# Patient Record
Sex: FEMALE | Race: WHITE | Employment: FULL TIME | URBAN - METROPOLITAN AREA
[De-identification: names, ages, dates, MRNs, and addresses within clinical notes are randomized per-mention and may not be internally consistent; named-entity substitution may affect disease eponyms.]

---

## 2017-07-06 ENCOUNTER — OFFICE VISIT (OUTPATIENT)
Dept: SURGERY | Age: 37
End: 2017-07-06

## 2017-07-06 VITALS
RESPIRATION RATE: 20 BRPM | BODY MASS INDEX: 42.81 KG/M2 | DIASTOLIC BLOOD PRESSURE: 100 MMHG | WEIGHT: 282.5 LBS | HEART RATE: 80 BPM | HEIGHT: 68 IN | TEMPERATURE: 99.3 F | SYSTOLIC BLOOD PRESSURE: 140 MMHG | OXYGEN SATURATION: 99 %

## 2017-07-06 DIAGNOSIS — Z46.51 ADMISSION FOR ADJUSTMENT OF GASTRIC LAP BAND: Primary | ICD-10-CM

## 2017-07-06 DIAGNOSIS — R63.5 WEIGHT GAIN: ICD-10-CM

## 2017-07-06 DIAGNOSIS — K21.9 GASTROESOPHAGEAL REFLUX DISEASE, ESOPHAGITIS PRESENCE NOT SPECIFIED: ICD-10-CM

## 2017-07-06 DIAGNOSIS — R13.19 INTERMITTENT DYSPHAGIA: ICD-10-CM

## 2017-07-06 DIAGNOSIS — E66.01 MORBID OBESITY WITH BMI OF 40.0-44.9, ADULT (HCC): ICD-10-CM

## 2017-07-06 RX ORDER — VENLAFAXINE HYDROCHLORIDE 150 MG/1
150 TABLET, EXTENDED RELEASE ORAL
COMMUNITY

## 2017-07-06 NOTE — PROGRESS NOTES
1. Have you been to the ER, urgent care clinic since your last visit? Hospitalized since your last visit? no     2. Have you seen or consulted any other health care providers outside of the [address] [place] [address] since your last visit? Include any pap smears or colon screening.  no

## 2017-07-06 NOTE — Clinical Note
Self pay band and she paid $100 today for deflation  She would like info on self pay price for band removal - thanks

## 2017-07-06 NOTE — PROGRESS NOTES
Subjective:   Oseas Aggarwal is a 40 y.o. female with previous lap band surgery, who is here today needing lap band adjustment because of weight gain, productive burping, reflux and difficulty swallowing. Dietary compliance is fair. She has had trouble with the band and eating for \"years\", but it has worsened over the past 8-12 months. She is experiencing increased reflux and regurgitation. She takes \"forever to eat\" because it \"sits and sometimes I throw up\". No port pain, no night or post prandial cough, no hoarseness. She has trouble eating in a social situation and with other people. She would like her band removed. She has not been seen in follow up for > 5 years and has no lap band coverage. Suspect she has a 10 cm band based on timing of her procedure. Surgeon was Diya Ng MD.      Objective:     Visit Vitals    BP (!) 140/100    Pulse 80    Temp 99.3 °F (37.4 °C) (Oral)    Resp 20    Ht 5' 7.5\" (1.715 m)    Wt 282 lb 8 oz (128.1 kg)    SpO2 99%    BMI 43.59 kg/m2      Estimated body mass index is 43.59 kg/(m^2) as calculated from the following:    Height as of this encounter: 5' 7.5\" (1.715 m). Weight as of this encounter: 282 lb 8 oz (128.1 kg). Wt Readings from Last 3 Encounters:   07/06/17 282 lb 8 oz (128.1 kg)   04/30/11 250 lb (113.4 kg)     Her change in weight since last visit: gained, 32 lbs. A + O x 3, here with mother   Chest  CTA  COR  RRR  ABD Soft, obese, port faintly palpable and non tender, ND, +BS, no hernias. EXT No edema; ambulating independently        Assessment/Plan: Morbid Obesity, status post lap-band surgery, needing un- fill adjustment  Lap Band FiIl Procedure    Verbal consent was obtained. The patient was placed in the supine position per her request as she fainted the last adjustment. The port area was prepped using sterile technique and a Dale needle was inserted. The port was accessed with ease.   Previous Fill Volume:  Unknown cc.   Added:  3 cc clear saline appearing fluid   Tolerated cup of water without nausea, vomiting, pain or regurgitation   Will proceed with UGI and then surgeon evaluation for removal of adjustable gastric band system   Patient tolerated the procedure well. UGI and surgeon visit next week  If develops abdominal pain and or intolerance to liquids she is to be seen asap  Briefly discussed options of revision to a gastric bypass, but she has no Bariatric coverage and just \"wants the band out\"  Emily Griffiths verbalized understanding and questions were answered to the best of my knowledge and ability. Red Zone symptoms educational materials were provided.                   \

## 2017-07-06 NOTE — MR AVS SNAPSHOT
Visit Information Date & Time Provider Department Dept. Phone Encounter #  
 7/6/2017 11:40 AM Joel Sanchez NP [facility] GENERAL SURGERY [address] [phone] [id number] Upcoming Health Maintenance Date Due DTaP/Tdap/Td series (1 - Tdap) 4/9/2001 PAP AKA CERVICAL CYTOLOGY 4/9/2001 INFLUENZA AGE 9 TO ADULT 8/1/2017 Allergies as of 7/6/2017  Review Complete On: 7/6/2017 By: Usha Carreon LPN Severity Noted Reaction Type Reactions Ceclor [Cefaclor]  04/30/2011   Side Effect Itching Sulfa (Sulfonamide Antibiotics)  04/30/2011   Side Effect Itching Current Immunizations  Never Reviewed No immunizations on file. Not reviewed this visit You Were Diagnosed With   
  
 Codes Comments Regurgitation    -  Primary ICD-10-CM: R11.10 ICD-9-CM: 787.03 Gastroesophageal reflux disease, esophagitis presence not specified     ICD-10-CM: K21.9 ICD-9-CM: 530.81 Intermittent dysphagia     ICD-10-CM: R13.19 ICD-9-CM: 787.29 Vitals BP Pulse Temp Resp Height(growth percentile) Weight(growth percentile) (!) 140/100 80 99.3 °F (37.4 °C) (Oral) 20 5' 7.5\" (1.715 m) 282 lb 8 oz (128.1 kg) SpO2 BMI OB Status Smoking Status 99% 43.59 kg/m2 Having regular periods Never Smoker Vitals History BMI and BSA Data Body Mass Index Body Surface Area  
 43.59 kg/m 2 2.47 m 2 Your Updated Medication List  
  
   
This list is accurate as of: 7/6/17 12:34 PM.  Always use your most recent med list.  
  
  
  
  
 SYNTHROID 150 mcg tablet Generic drug:  levothyroxine Take 150 mcg by mouth daily (before breakfast). Venlafaxine 150 mg Tr24 Take  by mouth. To-Do List   
 07/13/2017 Imaging:  XR UPPER GI W KUB/ BA SWALLOW Patient Instructions If you develop abdominal pain and or cannot tolerate fluids, you need to be seen asap, so please call the office [phone] or come to [facility] ER if after hours Wilda Vasques will call you to arrange date/time for the upper GI and plan on seeing surgeon the same day Upper GI Series: About This Test 
What is it? An upper gastrointestinal (GI) series looks at the upper and middle sections of the gastrointestinal tract. The test uses barium contrast material, fluoroscopy, and X-ray. Fluoroscopy is a kind of X-ray. Why is this test done? An upper GI series is done to: · Find the cause of gastrointestinal symptoms, such as vomiting, burping up food, trouble swallowing, or belly pain. · Find inflamed areas of the intestine. · Find narrow spots (strictures) in the upper intestinal tract or find ulcers, tumors, polyps, or pyloric stenosis. · Find swallowed objects. How can you prepare for the test? 
Tell your doctor if: 
· You are taking any medicine. · You are allergic to any medicines, barium, or any other X-ray contrast material. 
· You are or might be pregnant. This test is not done during pregnancy because of the risk of radiation to the baby (fetus). Your doctor may ask you to do one or all of the following: 
· Eat a low-fiber diet for a few days before the test. 
· Stop eating for 12 hours before the test. 
· Take a laxative to help clean out your intestines the evening before the test. 
· Stop taking certain medicines. What happens before the test? 
The test is usually done in a clinic or the X-ray department of a hospital. 
· You will need to take off your clothes and put on a hospital gown. · Take out any dentures, and take off any jewelry. What happens during the test? 
· You will lie on your back on an X-ray table. · You will have an X-ray taken before you drink the barium mix. Then you'll take small swallows repeatedly during the series of X-rays that follow. · The doctor watches the barium pass through your GI tract using fluoroscopy and X-ray pictures.  The table is tilted at different positions, and you may change positions to help spread the barium. What else should you know about the test? 
· You may be given a laxative or enema to flush the barium out of your intestines after the test. This prevents constipation. · It's a good idea to drink a lot of fluids for a few days to flush out the barium. · For 1 to 3 days after the test, your stool will look white from the barium. How long does the test take? · The test will take about 30 to 40 minutes. If you are also having a small bowel study, the test will take 2 to 6 hours. In some cases, you may be asked to come back after 24 hours to have more X-rays taken. What happens after the test? 
· You will probably be able to go home right away. Results of the test are usually ready in 1 to 3 days. · You can go back to your usual activities right away. You may eat and drink whatever you like, unless your doctor tells you not to. When should you call for help? Watch closely for changes in your health, and be sure to contact your doctor if: 
· You aren't able to have a bowel movement in 2 to 3 days after the test. 
Follow-up care is a key part of your treatment and safety. Be sure to make and go to all appointments, and call your doctor if you are having problems. It's also a good idea to keep a list of the medicines you take. Ask your doctor when you can expect to have your test results. Where can you learn more? Go to http://sonia-roxann.info/. Enter J416 in the search box to learn more about \"Upper GI Series: About This Test.\" Current as of: October 14, 2016 Content Version: 11.3 © 5892-8603 [employer]. Care instructions adapted under license by [employer] (which disclaims liability or warranty for this information).  If you have questions about a medical condition or this instruction, always ask your healthcare professional. [employer] disclaims any warranty or liability for your use of this information. Introducing [facility]! Maria Antonia Banda introduces [facility] patient portal. Now you can access parts of your medical record, email your doctor's office, and request medication refills online. 1. In your internet browser, go to [email or web address] 2. Click on the First Time User? Click Here link in the Sign In box. You will see the New Member Sign Up page. 3. Enter your [facility] Access Code exactly as it appears below. You will not need to use this code after youve completed the sign-up process. If you do not sign up before the expiration date, you must request a new code. · [facility] Access Code: [email or web address] Expires: 10/4/2017 12:07 PM 
 
4. Enter the last four digits of your Social Security Number (xxxx) and Date of Birth (mm/dd/yyyy) as indicated and click Submit. You will be taken to the next sign-up page. 5. Create a [facility] ID. This will be your [facility] login ID and cannot be changed, so think of one that is secure and easy to remember. 6. Create a [facility] password. You can change your password at any time. 7. Enter your Password Reset Question and Answer. This can be used at a later time if you forget your password. 8. Enter your e-mail address. You will receive e-mail notification when new information is available in [address] 9. Click Sign Up. You can now view and download portions of your medical record. 10. Click the Download Summary menu link to download a portable copy of your medical information. If you have questions, please visit the Frequently Asked Questions section of the [facility] website. Remember, [facility] is NOT to be used for urgent needs. For medical emergencies, dial 911. Now available from your iPhone and Android! Please provide this summary of care documentation to your next provider. Your primary care clinician is listed as Charmayne China. If you have any questions after today's visit, please call [phone].

## 2017-07-06 NOTE — PATIENT INSTRUCTIONS
If you develop abdominal pain and or cannot tolerate fluids, you need to be seen asap, so please call the office [phone] or come to [facility] ER if after hours     Harry Friend will call you to arrange date/time for the upper GI and plan on seeing surgeon the same day          Upper GI Series: About This Test  What is it? An upper gastrointestinal (GI) series looks at the upper and middle sections of the gastrointestinal tract. The test uses barium contrast material, fluoroscopy, and X-ray. Fluoroscopy is a kind of X-ray. Why is this test done? An upper GI series is done to:  · Find the cause of gastrointestinal symptoms, such as vomiting, burping up food, trouble swallowing, or belly pain. · Find inflamed areas of the intestine. · Find narrow spots (strictures) in the upper intestinal tract or find ulcers, tumors, polyps, or pyloric stenosis. · Find swallowed objects. How can you prepare for the test?  Tell your doctor if:  · You are taking any medicine. · You are allergic to any medicines, barium, or any other X-ray contrast material.  · You are or might be pregnant. This test is not done during pregnancy because of the risk of radiation to the baby (fetus). Your doctor may ask you to do one or all of the following:  · Eat a low-fiber diet for a few days before the test.  · Stop eating for 12 hours before the test.  · Take a laxative to help clean out your intestines the evening before the test.  · Stop taking certain medicines. What happens before the test?  The test is usually done in a clinic or the X-ray department of a hospital.  · You will need to take off your clothes and put on a hospital gown. · Take out any dentures, and take off any jewelry. What happens during the test?  · You will lie on your back on an X-ray table. · You will have an X-ray taken before you drink the barium mix. Then you'll take small swallows repeatedly during the series of X-rays that follow.   · The doctor watches the barium pass through your GI tract using fluoroscopy and X-ray pictures. The table is tilted at different positions, and you may change positions to help spread the barium. What else should you know about the test?  · You may be given a laxative or enema to flush the barium out of your intestines after the test. This prevents constipation. · It's a good idea to drink a lot of fluids for a few days to flush out the barium. · For 1 to 3 days after the test, your stool will look white from the barium. How long does the test take? · The test will take about 30 to 40 minutes. If you are also having a small bowel study, the test will take 2 to 6 hours. In some cases, you may be asked to come back after 24 hours to have more X-rays taken. What happens after the test?  · You will probably be able to go home right away. Results of the test are usually ready in 1 to 3 days. · You can go back to your usual activities right away. You may eat and drink whatever you like, unless your doctor tells you not to. When should you call for help? Watch closely for changes in your health, and be sure to contact your doctor if:  · You aren't able to have a bowel movement in 2 to 3 days after the test.  Follow-up care is a key part of your treatment and safety. Be sure to make and go to all appointments, and call your doctor if you are having problems. It's also a good idea to keep a list of the medicines you take. Ask your doctor when you can expect to have your test results. Where can you learn more? Go to http://sonia-roxann.info/. Enter T228 in the search box to learn more about \"Upper GI Series: About This Test.\"  Current as of: October 14, 2016  Content Version: 11.3  © 0464-4654 [employer]. Care instructions adapted under license by [employer] (which disclaims liability or warranty for this information).  If you have questions about a medical condition or this instruction, always ask your healthcare professional. [place] [address] any warranty or liability for your use of this information.

## 2017-07-13 ENCOUNTER — OFFICE VISIT (OUTPATIENT)
Dept: SURGERY | Age: 37
End: 2017-07-13

## 2017-07-13 ENCOUNTER — HOSPITAL ENCOUNTER (OUTPATIENT)
Dept: GENERAL RADIOLOGY | Age: 37
Discharge: HOME | End: 2017-07-13
Attending: NURSE PRACTITIONER
Payer: COMMERCIAL

## 2017-07-13 VITALS
BODY MASS INDEX: 43.19 KG/M2 | WEIGHT: 285 LBS | OXYGEN SATURATION: 98 % | SYSTOLIC BLOOD PRESSURE: 140 MMHG | HEIGHT: 68 IN | RESPIRATION RATE: 18 BRPM | TEMPERATURE: 98.6 F | HEART RATE: 80 BPM | DIASTOLIC BLOOD PRESSURE: 86 MMHG

## 2017-07-13 DIAGNOSIS — K21.9 GASTROESOPHAGEAL REFLUX DISEASE, ESOPHAGITIS PRESENCE NOT SPECIFIED: ICD-10-CM

## 2017-07-13 DIAGNOSIS — R13.19 INTERMITTENT DYSPHAGIA: ICD-10-CM

## 2017-07-13 DIAGNOSIS — R13.19 ESOPHAGEAL DYSPHAGIA: Primary | ICD-10-CM

## 2017-07-13 DIAGNOSIS — K21.9 GASTROESOPHAGEAL REFLUX DISEASE WITHOUT ESOPHAGITIS: ICD-10-CM

## 2017-07-13 DIAGNOSIS — R11.2 NON-INTRACTABLE VOMITING WITH NAUSEA, UNSPECIFIED VOMITING TYPE: ICD-10-CM

## 2017-07-13 PROCEDURE — 74241 XR UPPER GI SERIES W KUB: CPT

## 2017-07-13 NOTE — PROGRESS NOTES
1. Have you been to the ER, urgent care clinic since your last visit? Hospitalized since your last visit? No    2. Have you seen or consulted any other health care providers outside of the [address] [place] [address] since your last visit? Include any pap smears or colon screening. No     Rosa Levy  Body composition    female  40 y.o. Vitals:    07/13/17 1439   BP: 140/86   Pulse: 80   Resp: 18   Temp: 98.6 °F (37 °C)   TempSrc: Oral   SpO2: 98%   Weight: 285 lb (129.3 kg)   Height: 5' 7.5\" (1.715 m)     Body mass index is 43.98 kg/(m^2).

## 2017-07-17 PROBLEM — K21.9 GASTROESOPHAGEAL REFLUX DISEASE WITHOUT ESOPHAGITIS: Status: ACTIVE | Noted: 2017-07-17

## 2017-07-17 PROBLEM — R13.19 ESOPHAGEAL DYSPHAGIA: Status: ACTIVE | Noted: 2017-07-17

## 2017-07-17 PROBLEM — R11.2 NON-INTRACTABLE VOMITING WITH NAUSEA: Status: ACTIVE | Noted: 2017-07-17

## 2017-07-17 NOTE — PROGRESS NOTES
Subjective:      Olesya Johns is a 40 y.o. female presents for postop care 12 years following LAGB with Dr. Kike Lamb. Appetite is fair. Eating a regular diet with persistent dysphagia, reflux, and regurgitation despite band decompression. Bowel movements are regular. The patient is voiding without difficulty. She denies hematemesis or port-site pain. Objective:     Visit Vitals    /86 (BP 1 Location: Right arm, BP Patient Position: Sitting)    Pulse 80    Temp 98.6 °F (37 °C) (Oral)    Resp 18    Ht 5' 7.5\" (1.715 m)    Wt 285 lb (129.3 kg)    SpO2 98%    BMI 43.98 kg/m2       General:  alert, cooperative, no distress, appears stated age, morbidly obese   Abdomen: deferred   Incision:   deferred     Assessment:     Dysphagia, regurgitation, GERD following laparoscopic adjustable gastric banding. No improvement following band decompression. She desires band/port removal. I consider this a reasonable option given her symptoms. Plan:     1. Continue current medications. 2. Diet as tolerated. 3. Will arrange price analysis for self-pay laparoscopic removal of adjustable gastric band/port components as outpatient procedure.

## 2017-08-01 ENCOUNTER — HOSPITAL ENCOUNTER (OUTPATIENT)
Dept: PREADMISSION TESTING | Age: 37
Discharge: HOME | End: 2017-08-01
Payer: SELF-PAY

## 2017-08-01 ENCOUNTER — OFFICE VISIT (OUTPATIENT)
Dept: SURGERY | Age: 37
End: 2017-08-01

## 2017-08-01 VITALS
TEMPERATURE: 80 F | HEART RATE: 98 BPM | SYSTOLIC BLOOD PRESSURE: 119 MMHG | DIASTOLIC BLOOD PRESSURE: 80 MMHG | HEIGHT: 68 IN | BODY MASS INDEX: 42.36 KG/M2 | OXYGEN SATURATION: 99 % | RESPIRATION RATE: 18 BRPM | WEIGHT: 279.5 LBS

## 2017-08-01 VITALS
WEIGHT: 279.5 LBS | SYSTOLIC BLOOD PRESSURE: 119 MMHG | HEIGHT: 68 IN | BODY MASS INDEX: 42.36 KG/M2 | OXYGEN SATURATION: 99 % | TEMPERATURE: 98.3 F | RESPIRATION RATE: 18 BRPM | DIASTOLIC BLOOD PRESSURE: 80 MMHG

## 2017-08-01 DIAGNOSIS — K21.9 GASTROESOPHAGEAL REFLUX DISEASE WITHOUT ESOPHAGITIS: ICD-10-CM

## 2017-08-01 DIAGNOSIS — R13.19 ESOPHAGEAL DYSPHAGIA: Primary | ICD-10-CM

## 2017-08-01 DIAGNOSIS — R11.2 NON-INTRACTABLE VOMITING WITH NAUSEA, UNSPECIFIED VOMITING TYPE: ICD-10-CM

## 2017-08-01 LAB
BASOPHILS # BLD AUTO: 0 K/UL (ref 0–0.1)
BASOPHILS # BLD: 0 % (ref 0–1)
EOSINOPHIL # BLD: 0.1 K/UL (ref 0–0.4)
EOSINOPHIL NFR BLD: 1 % (ref 0–7)
ERYTHROCYTE [DISTWIDTH] IN BLOOD BY AUTOMATED COUNT: 13.3 % (ref 11.5–14.5)
HCT VFR BLD AUTO: 38 % (ref 35–47)
HGB BLD-MCNC: 12.6 G/DL (ref 11.5–16)
LYMPHOCYTES # BLD AUTO: 36 % (ref 12–49)
LYMPHOCYTES # BLD: 1.6 K/UL (ref 0.8–3.5)
MCH RBC QN AUTO: 29.3 PG (ref 26–34)
MCHC RBC AUTO-ENTMCNC: 33.2 G/DL (ref 30–36.5)
MCV RBC AUTO: 88.4 FL (ref 80–99)
MONOCYTES # BLD: 0.5 K/UL (ref 0–1)
MONOCYTES NFR BLD AUTO: 10 % (ref 5–13)
NEUTS SEG # BLD: 2.3 K/UL (ref 1.8–8)
NEUTS SEG NFR BLD AUTO: 53 % (ref 32–75)
PLATELET # BLD AUTO: 211 K/UL (ref 150–400)
RBC # BLD AUTO: 4.3 M/UL (ref 3.8–5.2)
WBC # BLD AUTO: 4.5 K/UL (ref 3.6–11)

## 2017-08-01 PROCEDURE — 85025 COMPLETE CBC W/AUTO DIFF WBC: CPT | Performed by: SURGERY

## 2017-08-01 PROCEDURE — 36415 COLL VENOUS BLD VENIPUNCTURE: CPT | Performed by: SURGERY

## 2017-08-01 RX ORDER — BISMUTH SUBSALICYLATE 262 MG
1 TABLET,CHEWABLE ORAL DAILY
COMMUNITY

## 2017-08-01 RX ORDER — LANOLIN ALCOHOL/MO/W.PET/CERES
1 CREAM (GRAM) TOPICAL DAILY
COMMUNITY

## 2017-08-01 NOTE — PROGRESS NOTES
1. Have you been to the ER, urgent care clinic since your last visit? Hospitalized since your last visit? no    2. Have you seen or consulted any other health care providers outside of the [address] [place] [address] since your last visit? Include any pap smears or colon screening.    no

## 2017-08-02 NOTE — PATIENT INSTRUCTIONS
Learning About Swallowing Problems  What are swallowing problems? Certain health problems that affect the nervous system can cause trouble swallowing. These conditions include stroke, ALS (also known as Shannan Gehrig's disease), Parkinson's disease, and multiple sclerosis. The muscles and nerves that help move food through the throat and esophagus may not work right. Growths, such as cancer, and other problems with your esophagus can also make it hard to swallow. The esophagus is the tube that leads from your throat to your stomach. How are swallowing problems diagnosed? A doctor or speech therapist will examine you to check for swallowing problems. You may get swallowing tests to check how well your throat muscles work. For these tests, you swallow a special liquid that helps the doctor see your throat and esophagus on an X-ray or video screen. Other tests use a thin, flexible tube called a scope to check for problems with your esophagus. The doctor puts the scope in your mouth and down your throat to look at your esophagus. What are the symptoms? Symptoms of swallowing problems may include:  · Trouble getting food or liquids to go down on the first try. · Gagging, choking, or coughing when you swallow. · Having food or liquids come back up through your throat, mouth, or nose after you swallow. · Feeling like foods or liquids are stuck in some part of your throat or chest.  · Pain when you swallow. How are swallowing problems treated? How swallowing problems are treated depends on the cause. The main goals of treatment will be to help you eat and swallow safely and get good nutrition. This is important for your health and quality of life. You may learn exercises to train your throat muscles to work together so you're able to swallow better. Learning certain ways to put food in your mouth or to position your head while eating may also help.   Your doctor or a speech therapist may recommend changes to your diet to help make it easier to swallow. You may need to avoid certain foods or liquids. You also may need to change the thickness of foods or liquids in your diet. To eat and swallow safely, follow any instructions you get from your doctor or therapist. These ideas may help:  · Sit upright when eating, drinking, and taking pills. · Take small bites of food. Chew completely and swallow before taking another bite. · Take small sips of liquids. Hold the liquid in your mouth as you prepare to swallow. · If eating makes you tired, eat smaller but more frequent meals. · If you cough or choke, lean forward and keep your chin tipped downward while you cough. Where can you learn more? Go to http://sonia-roxann.info/. Enter 151 [id number] [id number] in the search box to learn more about \"Learning About Swallowing Problems. \"  Current as of: March 20, 2017  Content Version: 11.3  © 8039-7202 [employer]. Care instructions adapted under license by [employer] (which disclaims liability or warranty for this information). If you have questions about a medical condition or this instruction, always ask your healthcare professional. [place] [address] any warranty or liability for your use of this information.

## 2017-08-02 NOTE — PROGRESS NOTES
Subjective:      Carroll Schreiber is a 40 y.o. female with a history of morbid obesity managed through laparoscopic adjustable gastric banding in 2005. She has developed dysphagia, regurgitation, and GERD symptoms. These symptoms have improved somewhat following band decompression but her ability to eat normally is still limited. She denies fever, chills, hematemesis, aspiration, wheezing, or chest pain. Patient Active Problem List    Diagnosis Date Noted    Esophageal dysphagia 07/17/2017    Regurgitation 07/17/2017    Non-intractable vomiting with nausea 07/17/2017    Gastroesophageal reflux disease without esophagitis 07/17/2017     Past Medical History:   Diagnosis Date    Headache     Hypothyroid     Psychiatric disorder     DEPRESSION    Sleep apnea     USES A MOUTH GUARD      Past Surgical History:   Procedure Laterality Date    ABDOMEN SURGERY PROC UNLISTED  2005    Lap band surgery by Dr. Juan José Lopez.  HX HEENT  1997    wisdom teeth removed    SINUS SURGERY PROC UNLISTED  2013      Social History   Substance Use Topics    Smoking status: Never Smoker    Smokeless tobacco: Never Used    Alcohol use Yes      Comment: Socially      Family History   Problem Relation Age of Onset    Heart Disease Father     Hypertension Father     Anesth Problems Mother      NAUSEA, NO VOMITING    Asthma Brother     Cancer Maternal Grandmother      BREAST CA    Diabetes Neg Hx       Current Outpatient Prescriptions   Medication Sig    glucosamine-chondroitin (ARTHX) 500-400 mg cap Take 1 Cap by mouth daily.  multivitamin (MULTIPLE VITAMINS) tablet Take 1 Tab by mouth daily.  Venlafaxine 150 mg tr24 Take 150 mg by mouth nightly.  levothyroxine (SYNTHROID) 150 mcg tablet Take 150 mcg by mouth daily (before breakfast). No current facility-administered medications for this visit.        Allergies   Allergen Reactions    Ceclor [Cefaclor] Itching    Sulfa (Sulfonamide Antibiotics) Itching Review of Systems:    A complete review of systems was negative except as noted in the HPI. Objective:        Visit Vitals    /80    Pulse 98    Temp (!) 80 °F (26.7 °C)    Resp 18    Ht 5' 7.5\" (1.715 m)    Wt 279 lb 8 oz (126.8 kg)    LMP 07/04/2017    SpO2 99%    BMI 43.13 kg/m2       Physical Exam:  GENERAL: alert, cooperative, no distress, appears stated age, morbidly obese, EYE: negative, THROAT & NECK: normal, LUNG: clear to auscultation bilaterally, HEART: regular rate and rhythm, S1, S2 normal, no murmur. ABDOMEN: Obese, non-distended, soft; palpable, non-tender port. No pain with palpation or cellulitis. EXTREMITIES:  extremities normal, atraumatic, no cyanosis or edema, SKIN: Normal., NEUROLOGIC: negative      Assessment:     Dysphagia following laparoscopic adjustable gastric banding. Plan:     1. I recommend proceeding with laparoscopic removal of band/port components with upper endoscopy. 2. Discussed aspects of surgical intervention, methods, risks (including by not limited to infection, bleeding, hematoma, conversion to open procedure, recurrent/persistent symptoms, and perforation of the intestines or solid organs), and the risks of general anesthetic. The patient understands the risks; all questions were answered to the patient's satisfaction. 3. Patient does wish to proceed with surgery.       Signed By: Catie Melendez MD     August 2, 2017

## 2017-08-11 ENCOUNTER — ANESTHESIA (OUTPATIENT)
Dept: SURGERY | Age: 37
End: 2017-08-11
Payer: SELF-PAY

## 2017-08-11 ENCOUNTER — ANESTHESIA EVENT (OUTPATIENT)
Dept: SURGERY | Age: 37
End: 2017-08-11
Payer: SELF-PAY

## 2017-08-11 ENCOUNTER — HOSPITAL ENCOUNTER (OUTPATIENT)
Age: 37
Setting detail: OUTPATIENT SURGERY
Discharge: HOME | End: 2017-08-11
Attending: SURGERY | Admitting: SURGERY
Payer: SELF-PAY

## 2017-08-11 VITALS
HEART RATE: 80 BPM | OXYGEN SATURATION: 96 % | BODY MASS INDEX: 42.44 KG/M2 | TEMPERATURE: 98.4 F | HEIGHT: 68 IN | DIASTOLIC BLOOD PRESSURE: 69 MMHG | SYSTOLIC BLOOD PRESSURE: 128 MMHG | RESPIRATION RATE: 20 BRPM | WEIGHT: 280 LBS

## 2017-08-11 LAB — HCG UR QL: NEGATIVE

## 2017-08-11 PROCEDURE — 76060000033 HC ANESTHESIA 1 TO 1.5 HR: Performed by: SURGERY

## 2017-08-11 PROCEDURE — 77030027138 HC INCENT SPIROMETER -A

## 2017-08-11 PROCEDURE — 77030035045 HC TRCR ENDOSC VRSPRT BLDLSS COVD -B: Performed by: SURGERY

## 2017-08-11 PROCEDURE — 77030032435: Performed by: SURGERY

## 2017-08-11 PROCEDURE — 74011250636 HC RX REV CODE- 250/636: Performed by: ANESTHESIOLOGY

## 2017-08-11 PROCEDURE — 77030018836 HC SOL IRR NACL ICUM -A: Performed by: SURGERY

## 2017-08-11 PROCEDURE — 74011250636 HC RX REV CODE- 250/636

## 2017-08-11 PROCEDURE — 76210000021 HC REC RM PH II 0.5 TO 1 HR: Performed by: SURGERY

## 2017-08-11 PROCEDURE — 76210000016 HC OR PH I REC 1 TO 1.5 HR: Performed by: SURGERY

## 2017-08-11 PROCEDURE — 77030035048 HC TRCR ENDOSC OPTCL COVD -B: Performed by: SURGERY

## 2017-08-11 PROCEDURE — 74011250637 HC RX REV CODE- 250/637: Performed by: ANESTHESIOLOGY

## 2017-08-11 PROCEDURE — 77030034154 HC SHR COAG HARM ACE J&J -F: Performed by: SURGERY

## 2017-08-11 PROCEDURE — 77030020053 HC ELECTRD LAPSCP COVD -B: Performed by: SURGERY

## 2017-08-11 PROCEDURE — 77030002895 HC DEV VASC CLOSR COVD -B: Performed by: SURGERY

## 2017-08-11 PROCEDURE — 77030011640 HC PAD GRND REM COVD -A: Performed by: SURGERY

## 2017-08-11 PROCEDURE — 76010000149 HC OR TIME 1 TO 1.5 HR: Performed by: SURGERY

## 2017-08-11 PROCEDURE — 74011000250 HC RX REV CODE- 250

## 2017-08-11 PROCEDURE — 74011000250 HC RX REV CODE- 250: Performed by: SURGERY

## 2017-08-11 PROCEDURE — 81025 URINE PREGNANCY TEST: CPT

## 2017-08-11 PROCEDURE — 77030002933 HC SUT MCRYL J&J -A: Performed by: SURGERY

## 2017-08-11 PROCEDURE — 77030035051: Performed by: SURGERY

## 2017-08-11 PROCEDURE — 77030013079 HC BLNKT BAIR HGGR 3M -A: Performed by: ANESTHESIOLOGY

## 2017-08-11 PROCEDURE — 74011250636 HC RX REV CODE- 250/636: Performed by: SURGERY

## 2017-08-11 PROCEDURE — 77030008684 HC TU ET CUF COVD -B: Performed by: ANESTHESIOLOGY

## 2017-08-11 PROCEDURE — 77030008756 HC TU IRR SUC STRY -B: Performed by: SURGERY

## 2017-08-11 PROCEDURE — 77030010507 HC ADH SKN DERMBND J&J -B: Performed by: SURGERY

## 2017-08-11 PROCEDURE — 77030020747 HC TU INSUF ENDOSC TELE -A: Performed by: SURGERY

## 2017-08-11 PROCEDURE — 77030008771 HC TU NG SALEM SUMP -A: Performed by: ANESTHESIOLOGY

## 2017-08-11 PROCEDURE — 77030026438 HC STYL ET INTUB CARD -A: Performed by: ANESTHESIOLOGY

## 2017-08-11 PROCEDURE — 77030020782 HC GWN BAIR PAWS FLX 3M -B

## 2017-08-11 PROCEDURE — 77030031139 HC SUT VCRL2 J&J -A: Performed by: SURGERY

## 2017-08-11 PROCEDURE — 77030032490 HC SLV COMPR SCD KNE COVD -B: Performed by: SURGERY

## 2017-08-11 PROCEDURE — 77030037032 HC INSRT SCIS CLICKLLINE DISP STOR -B: Performed by: SURGERY

## 2017-08-11 RX ORDER — KETOROLAC TROMETHAMINE 30 MG/ML
INJECTION, SOLUTION INTRAMUSCULAR; INTRAVENOUS AS NEEDED
Status: DISCONTINUED | OUTPATIENT
Start: 2017-08-11 | End: 2017-08-11 | Stop reason: HOSPADM

## 2017-08-11 RX ORDER — MIDAZOLAM HYDROCHLORIDE 1 MG/ML
0.5 INJECTION, SOLUTION INTRAMUSCULAR; INTRAVENOUS
Status: DISCONTINUED | OUTPATIENT
Start: 2017-08-11 | End: 2017-08-11 | Stop reason: HOSPADM

## 2017-08-11 RX ORDER — OXYCODONE AND ACETAMINOPHEN 5; 325 MG/1; MG/1
1-2 TABLET ORAL
Qty: 40 TAB | Refills: 0 | Status: SHIPPED | OUTPATIENT
Start: 2017-08-11 | End: 2017-08-25

## 2017-08-11 RX ORDER — KETAMINE HYDROCHLORIDE 10 MG/ML
INJECTION, SOLUTION INTRAMUSCULAR; INTRAVENOUS AS NEEDED
Status: DISCONTINUED | OUTPATIENT
Start: 2017-08-11 | End: 2017-08-11 | Stop reason: HOSPADM

## 2017-08-11 RX ORDER — SODIUM CHLORIDE 0.9 % (FLUSH) 0.9 %
5-10 SYRINGE (ML) INJECTION EVERY 8 HOURS
Status: DISCONTINUED | OUTPATIENT
Start: 2017-08-11 | End: 2017-08-11 | Stop reason: HOSPADM

## 2017-08-11 RX ORDER — LIDOCAINE HYDROCHLORIDE 10 MG/ML
0.1 INJECTION, SOLUTION EPIDURAL; INFILTRATION; INTRACAUDAL; PERINEURAL AS NEEDED
Status: DISCONTINUED | OUTPATIENT
Start: 2017-08-11 | End: 2017-08-11 | Stop reason: HOSPADM

## 2017-08-11 RX ORDER — NEOSTIGMINE METHYLSULFATE 1 MG/ML
INJECTION INTRAVENOUS AS NEEDED
Status: DISCONTINUED | OUTPATIENT
Start: 2017-08-11 | End: 2017-08-11 | Stop reason: HOSPADM

## 2017-08-11 RX ORDER — HYDROMORPHONE HYDROCHLORIDE 1 MG/ML
0.5 INJECTION, SOLUTION INTRAMUSCULAR; INTRAVENOUS; SUBCUTANEOUS
Status: DISCONTINUED | OUTPATIENT
Start: 2017-08-11 | End: 2017-08-11 | Stop reason: HOSPADM

## 2017-08-11 RX ORDER — PROPOFOL 10 MG/ML
INJECTION, EMULSION INTRAVENOUS AS NEEDED
Status: DISCONTINUED | OUTPATIENT
Start: 2017-08-11 | End: 2017-08-11 | Stop reason: HOSPADM

## 2017-08-11 RX ORDER — FENTANYL CITRATE 50 UG/ML
50 INJECTION, SOLUTION INTRAMUSCULAR; INTRAVENOUS AS NEEDED
Status: DISCONTINUED | OUTPATIENT
Start: 2017-08-11 | End: 2017-08-11 | Stop reason: HOSPADM

## 2017-08-11 RX ORDER — ROCURONIUM BROMIDE 10 MG/ML
INJECTION, SOLUTION INTRAVENOUS AS NEEDED
Status: DISCONTINUED | OUTPATIENT
Start: 2017-08-11 | End: 2017-08-11 | Stop reason: HOSPADM

## 2017-08-11 RX ORDER — SUCCINYLCHOLINE CHLORIDE 20 MG/ML
INJECTION INTRAMUSCULAR; INTRAVENOUS AS NEEDED
Status: DISCONTINUED | OUTPATIENT
Start: 2017-08-11 | End: 2017-08-11 | Stop reason: HOSPADM

## 2017-08-11 RX ORDER — SODIUM CHLORIDE 0.9 % (FLUSH) 0.9 %
5-10 SYRINGE (ML) INJECTION AS NEEDED
Status: DISCONTINUED | OUTPATIENT
Start: 2017-08-11 | End: 2017-08-11 | Stop reason: HOSPADM

## 2017-08-11 RX ORDER — ONDANSETRON 4 MG/1
4 TABLET, ORALLY DISINTEGRATING ORAL
Status: COMPLETED | OUTPATIENT
Start: 2017-08-11 | End: 2017-08-11

## 2017-08-11 RX ORDER — ROPIVACAINE HYDROCHLORIDE 5 MG/ML
30 INJECTION, SOLUTION EPIDURAL; INFILTRATION; PERINEURAL AS NEEDED
Status: DISCONTINUED | OUTPATIENT
Start: 2017-08-11 | End: 2017-08-11 | Stop reason: HOSPADM

## 2017-08-11 RX ORDER — FENTANYL CITRATE 50 UG/ML
25 INJECTION, SOLUTION INTRAMUSCULAR; INTRAVENOUS
Status: DISCONTINUED | OUTPATIENT
Start: 2017-08-11 | End: 2017-08-11 | Stop reason: HOSPADM

## 2017-08-11 RX ORDER — ONDANSETRON 2 MG/ML
INJECTION INTRAMUSCULAR; INTRAVENOUS AS NEEDED
Status: DISCONTINUED | OUTPATIENT
Start: 2017-08-11 | End: 2017-08-11 | Stop reason: HOSPADM

## 2017-08-11 RX ORDER — DIPHENHYDRAMINE HYDROCHLORIDE 50 MG/ML
12.5 INJECTION, SOLUTION INTRAMUSCULAR; INTRAVENOUS AS NEEDED
Status: DISCONTINUED | OUTPATIENT
Start: 2017-08-11 | End: 2017-08-11 | Stop reason: HOSPADM

## 2017-08-11 RX ORDER — MAGNESIUM SULFATE HEPTAHYDRATE 40 MG/ML
INJECTION, SOLUTION INTRAVENOUS AS NEEDED
Status: DISCONTINUED | OUTPATIENT
Start: 2017-08-11 | End: 2017-08-11 | Stop reason: HOSPADM

## 2017-08-11 RX ORDER — BUPIVACAINE HYDROCHLORIDE 5 MG/ML
50 INJECTION, SOLUTION EPIDURAL; INTRACAUDAL ONCE
Status: COMPLETED | OUTPATIENT
Start: 2017-08-11 | End: 2017-08-11

## 2017-08-11 RX ORDER — GLYCOPYRROLATE 0.2 MG/ML
INJECTION INTRAMUSCULAR; INTRAVENOUS AS NEEDED
Status: DISCONTINUED | OUTPATIENT
Start: 2017-08-11 | End: 2017-08-11 | Stop reason: HOSPADM

## 2017-08-11 RX ORDER — SODIUM CHLORIDE, SODIUM LACTATE, POTASSIUM CHLORIDE, CALCIUM CHLORIDE 600; 310; 30; 20 MG/100ML; MG/100ML; MG/100ML; MG/100ML
100 INJECTION, SOLUTION INTRAVENOUS CONTINUOUS
Status: DISCONTINUED | OUTPATIENT
Start: 2017-08-11 | End: 2017-08-11 | Stop reason: HOSPADM

## 2017-08-11 RX ORDER — MORPHINE SULFATE 10 MG/ML
2 INJECTION, SOLUTION INTRAMUSCULAR; INTRAVENOUS
Status: DISCONTINUED | OUTPATIENT
Start: 2017-08-11 | End: 2017-08-11 | Stop reason: HOSPADM

## 2017-08-11 RX ORDER — DEXAMETHASONE SODIUM PHOSPHATE 4 MG/ML
INJECTION, SOLUTION INTRA-ARTICULAR; INTRALESIONAL; INTRAMUSCULAR; INTRAVENOUS; SOFT TISSUE AS NEEDED
Status: DISCONTINUED | OUTPATIENT
Start: 2017-08-11 | End: 2017-08-11 | Stop reason: HOSPADM

## 2017-08-11 RX ORDER — ONDANSETRON 2 MG/ML
4 INJECTION INTRAMUSCULAR; INTRAVENOUS AS NEEDED
Status: DISCONTINUED | OUTPATIENT
Start: 2017-08-11 | End: 2017-08-11 | Stop reason: HOSPADM

## 2017-08-11 RX ORDER — FENTANYL CITRATE 50 UG/ML
INJECTION, SOLUTION INTRAMUSCULAR; INTRAVENOUS AS NEEDED
Status: DISCONTINUED | OUTPATIENT
Start: 2017-08-11 | End: 2017-08-11 | Stop reason: HOSPADM

## 2017-08-11 RX ORDER — MIDAZOLAM HYDROCHLORIDE 1 MG/ML
1 INJECTION, SOLUTION INTRAMUSCULAR; INTRAVENOUS AS NEEDED
Status: DISCONTINUED | OUTPATIENT
Start: 2017-08-11 | End: 2017-08-11 | Stop reason: HOSPADM

## 2017-08-11 RX ORDER — ONDANSETRON 4 MG/1
4 TABLET, ORALLY DISINTEGRATING ORAL
Qty: 10 TAB | Refills: 0 | Status: SHIPPED | OUTPATIENT
Start: 2017-08-11 | End: 2017-08-25

## 2017-08-11 RX ORDER — LIDOCAINE HYDROCHLORIDE 20 MG/ML
INJECTION, SOLUTION EPIDURAL; INFILTRATION; INTRACAUDAL; PERINEURAL AS NEEDED
Status: DISCONTINUED | OUTPATIENT
Start: 2017-08-11 | End: 2017-08-11 | Stop reason: HOSPADM

## 2017-08-11 RX ORDER — SODIUM CHLORIDE 9 MG/ML
25 INJECTION, SOLUTION INTRAVENOUS CONTINUOUS
Status: DISCONTINUED | OUTPATIENT
Start: 2017-08-11 | End: 2017-08-11 | Stop reason: HOSPADM

## 2017-08-11 RX ORDER — VANCOMYCIN 2 GRAM/500 ML IN 0.9 % SODIUM CHLORIDE INTRAVENOUS
2000
Status: COMPLETED | OUTPATIENT
Start: 2017-08-11 | End: 2017-08-11

## 2017-08-11 RX ORDER — MIDAZOLAM HYDROCHLORIDE 1 MG/ML
INJECTION, SOLUTION INTRAMUSCULAR; INTRAVENOUS AS NEEDED
Status: DISCONTINUED | OUTPATIENT
Start: 2017-08-11 | End: 2017-08-11 | Stop reason: HOSPADM

## 2017-08-11 RX ADMIN — GLYCOPYRROLATE 0.2 MG: 0.2 INJECTION INTRAMUSCULAR; INTRAVENOUS at 08:33

## 2017-08-11 RX ADMIN — PROPOFOL 200 MG: 10 INJECTION, EMULSION INTRAVENOUS at 07:40

## 2017-08-11 RX ADMIN — VANCOMYCIN HYDROCHLORIDE 2000 MG: 10 INJECTION, POWDER, LYOPHILIZED, FOR SOLUTION INTRAVENOUS at 07:06

## 2017-08-11 RX ADMIN — LIDOCAINE HYDROCHLORIDE 100 MG: 20 INJECTION, SOLUTION EPIDURAL; INFILTRATION; INTRACAUDAL; PERINEURAL at 07:38

## 2017-08-11 RX ADMIN — SUCCINYLCHOLINE CHLORIDE 140 MG: 20 INJECTION INTRAMUSCULAR; INTRAVENOUS at 07:41

## 2017-08-11 RX ADMIN — FENTANYL CITRATE 100 MCG: 50 INJECTION, SOLUTION INTRAMUSCULAR; INTRAVENOUS at 07:38

## 2017-08-11 RX ADMIN — ROCURONIUM BROMIDE 10 MG: 10 INJECTION, SOLUTION INTRAVENOUS at 07:39

## 2017-08-11 RX ADMIN — ONDANSETRON 4 MG: 4 TABLET, ORALLY DISINTEGRATING ORAL at 10:27

## 2017-08-11 RX ADMIN — GLYCOPYRROLATE 0.2 MG: 0.2 INJECTION INTRAMUSCULAR; INTRAVENOUS at 08:17

## 2017-08-11 RX ADMIN — ONDANSETRON 4 MG: 2 INJECTION INTRAMUSCULAR; INTRAVENOUS at 09:23

## 2017-08-11 RX ADMIN — NEOSTIGMINE METHYLSULFATE 4 MG: 1 INJECTION INTRAVENOUS at 08:30

## 2017-08-11 RX ADMIN — GLYCOPYRROLATE 0.6 MG: 0.2 INJECTION INTRAMUSCULAR; INTRAVENOUS at 08:30

## 2017-08-11 RX ADMIN — MIDAZOLAM HYDROCHLORIDE 2 MG: 1 INJECTION, SOLUTION INTRAMUSCULAR; INTRAVENOUS at 07:33

## 2017-08-11 RX ADMIN — KETOROLAC TROMETHAMINE 30 MG: 30 INJECTION, SOLUTION INTRAMUSCULAR; INTRAVENOUS at 08:29

## 2017-08-11 RX ADMIN — MAGNESIUM SULFATE HEPTAHYDRATE 2 G: 40 INJECTION, SOLUTION INTRAVENOUS at 07:57

## 2017-08-11 RX ADMIN — NEOSTIGMINE METHYLSULFATE 1 MG: 1 INJECTION INTRAVENOUS at 08:33

## 2017-08-11 RX ADMIN — HYDROMORPHONE HYDROCHLORIDE 0.5 MG: 1 INJECTION, SOLUTION INTRAMUSCULAR; INTRAVENOUS; SUBCUTANEOUS at 09:25

## 2017-08-11 RX ADMIN — ROCURONIUM BROMIDE 40 MG: 10 INJECTION, SOLUTION INTRAVENOUS at 07:51

## 2017-08-11 RX ADMIN — DEXAMETHASONE SODIUM PHOSPHATE 8 MG: 4 INJECTION, SOLUTION INTRA-ARTICULAR; INTRALESIONAL; INTRAMUSCULAR; INTRAVENOUS; SOFT TISSUE at 08:03

## 2017-08-11 RX ADMIN — MIDAZOLAM HYDROCHLORIDE 3 MG: 1 INJECTION, SOLUTION INTRAMUSCULAR; INTRAVENOUS at 07:29

## 2017-08-11 RX ADMIN — KETAMINE HYDROCHLORIDE 50 MG: 10 INJECTION, SOLUTION INTRAMUSCULAR; INTRAVENOUS at 07:55

## 2017-08-11 RX ADMIN — SODIUM CHLORIDE, SODIUM LACTATE, POTASSIUM CHLORIDE, AND CALCIUM CHLORIDE 100 ML/HR: 600; 310; 30; 20 INJECTION, SOLUTION INTRAVENOUS at 07:06

## 2017-08-11 RX ADMIN — ONDANSETRON 4 MG: 2 INJECTION INTRAMUSCULAR; INTRAVENOUS at 08:03

## 2017-08-11 NOTE — BRIEF OP NOTE
BRIEF OPERATIVE NOTE    Date of Procedure: 8/11/2017   Preoperative Diagnosis: INTRACTABLE NAUSEA AND PAIN  Postoperative Diagnosis: INTRACTABLE NAUSEA AND PAIN    Procedure(s):  LAPAROSCOPIC REMOVAL ADJUSTABLE GASTRIC BANDING SYSTEM   Surgeon(s) and Role:     * Nathaly Soria MD - Primary         Assistant Staff:  Physician Assistant: Verner Landry, PA    Surgical Staff:  Circ-1: Allie Loredo RN  Physician Assistant: Verner Landry, PA  Scrub Tech-1: Desiree Sofia  Event Time In   Incision Start 0800   Incision Close      Anesthesia: General   Estimated Blood Loss: 30 cc  Specimens: * No specimens in log *   Findings: laparoscopic removal of AP-S Lap band system   Complications: none  Implants: * No implants in log *

## 2017-08-11 NOTE — DISCHARGE INSTRUCTIONS
PATIENT DISCHARGE INSTRUCTIONS      PATIENT DISCHARGE INSTRUCTIONS    Matthew Staton / [id number] : 1980    Admitted (Not on file) Discharged: 8/10/2017       · It is important that you take the medication exactly as they are prescribed. · Keep your medication in the bottles provided by the pharmacist and keep a list of the medication names, dosages, and times to be taken in your wallet. · Do not take other medications without consulting your doctor. What to do at Home    Recommended Diet: soft diet, advance as tolerated. Recommended Activity: No heavy lifting, pushing, pulling x 2 weeks; may shower Saturday. If you experience any of the following symptoms (fever, chills, wound drainage) please follow up with General Surgery. Future Appointments  Date Time Provider Cesar Leblanc   2017 11:40 AM ANDREA Romeo   2017 8:40 AM ANDREA Romeo       Signed By: Rubens Pond MD     August 10, 2017                Learning About Using an Incentive Spirometer  What is an incentive spirometer? An incentive spirometer is a handheld device that exercises your lungs and measures how much air you can breathe in. It tells you and your doctor how well your lungs are working. The spirometer can help you practice taking deep breaths. Deep breaths can help open your airways and prevent fluid or mucus from building up in your lungs, and make it easier for you to breathe. Using the device can help prevent serious lung infections like pneumonia, improve your breathing after you've had pneumonia or surgery, and keep your airways open and lungs active if you can't get out of bed. How do you use an incentive spirometer? When you use an incentive spirometer, you breathe in air through a tube that is connected to a large air column containing a piston or ball. As you breathe in, the piston or ball inside the column moves up.  The height of the piston or ball shows how much air you breathed in. You may feel lightheaded when you breathe in deeply for this exercise. If you feel dizzy or feel like you're going to pass out, stop the exercise and rest.  Each time you do this exercise, keep track of your progress by writing down how high the piston or ball moves up the column. 4. Move the slider on the outside of the large column to the level that you want to reach or that your doctor recommended. 5. Sit or stand up straight, and hold the spirometer in front of you. Be sure to keep it level. 6. To start, breathe out normally. Then close your lips tightly around the mouthpiece. Make sure that you don't block the mouthpiece with your tongue. 7. Take a slow, deep breath. Breathe in as deeply as you can. As you breathe in, the piston or ball inside the large column will move up. Try to move the piston or ball as high up as you can or to the level your doctor recommended. When you can't breathe in anymore, hold your breath for 2 to 5 seconds. 8. Relax, take the mouthpiece out of your mouth, and breathe out normally. 9. Repeat steps 1 through 5 as many times as your doctor tells you to. 10. After you've taken the recommended number of breaths, try to cough a few times. This will help loosen any mucus that has built up in your lungs. It will make it easier for you to breathe. If you just had surgery on your belly or chest, hold a pillow over your cut (incision) when you cough. Follow-up care is a key part of your treatment and safety. Be sure to make and go to all appointments, and call your doctor if you are having problems. It's also a good idea to know your test results and keep a list of the medicines you take.       After general anesthesia or intravenous sedation, for 24 hours or while taking prescription Narcotics:  · Limit your activities  · Do not drive and operate hazardous machinery  · Do not make important personal or business decisions  · Do  not drink alcoholic beverages  · If you have not urinated within 8 hours after discharge, please contact your surgeon on call.     Report the following to your surgeon:  · Excessive pain, swelling, redness or odor of or around the surgical area  · Temperature over 100.5  · Nausea and vomiting lasting longer than 4 hours or if unable to take medications  · Any signs of decreased circulation or nerve impairment to extremity: change in color, persistent  numbness, tingling, coldness or increase pain  · Any questions

## 2017-08-11 NOTE — PERIOP NOTES
Spoke with patients mother Jason Davila via cell phone and updated her on start of procedure. Surgeon aware.

## 2017-08-11 NOTE — ANESTHESIA POSTPROCEDURE EVALUATION
Post-Anesthesia Evaluation and Assessment    Patient: Emilia Tracey MRN: [id number]  SSN: [id number]    YOB: 1980  Age: 40 y.o. Sex: female       Cardiovascular Function/Vital Signs  Visit Vitals    /69    Pulse 80    Temp 36.9 °C (98.4 °F)    Resp 20    Ht 5' 7.5\" (1.715 m)    Wt 127 kg (280 lb)    SpO2 96%    BMI 43.21 kg/m2       Patient is status post general anesthesia for Procedure(s):  6 [facility] 7. Nausea/Vomiting: None    Postoperative hydration reviewed and adequate. Pain:  Pain Scale 1: Numeric (0 - 10) (08/11/17 1000)  Pain Intensity 1: 0 (08/11/17 1000)   Managed    Neurological Status:   Neuro (WDL): Within Defined Limits (08/11/17 0616)   At baseline    Mental Status and Level of Consciousness: Arousable    Pulmonary Status:   O2 Device: Room air (08/11/17 1000)   Adequate oxygenation and airway patent    Complications related to anesthesia: None    Post-anesthesia assessment completed.  No concerns    Signed By: Dominga Bowman MD     August 11, 2017

## 2017-08-11 NOTE — IP AVS SNAPSHOT
[address] 
[phone] Patient: Beverley Medina MRN: [id number] OEN:[birth year] You are allergic to the following Allergen Reactions Ceclor (Cefaclor) Itching Sulfa (Sulfonamide Antibiotics) Itching Recent Documentation Height Weight BMI OB Status Smoking Status 1.715 m 127 kg 43.21 kg/m2 Having regular periods Never Smoker Emergency Contacts Name Discharge Info Relation Home Work Mobile NoemiCarly DISCHARGE CAREGIVER [3] Mother [14] [phone] About your hospitalization You were admitted on:  August 11, 2017 You last received care in the:  [facility] PACU You were discharged on:  August 11, 2017 Unit phone number:  [phone] Why you were hospitalized Your primary diagnosis was:  Not on File Providers Seen During Your Hospitalizations Provider Role Specialty Primary office phone Pau Madera MD Attending Provider General Surgery [phone] Your Primary Care Physician (PCP) Primary Care Physician Office Phone Office Fax Haim Charles [phone] Follow-up Information Follow up With Details Comments Contact Info Eric Mcmillan MD   [address] [address] 
[phone] Your Appointments Friday August 25, 2017 11:40 AM EDT  
POST OP 10 MIN with Rocky Parsons NP  
[facility] 22 832 ([address]) [address] [place] [phone]  
[phone] Friday September 08, 2017  8:40 AM EDT  
POST OP 10 MIN with Rocky Parsons NP  
[facility] 22 535 ([address]) [address] [phone] [id number] [phone] Current Discharge Medication List  
  
START taking these medications Dose & Instructions Dispensing Information Comments Morning Noon Evening Bedtime  
 ondansetron 4 mg disintegrating tablet Commonly known as:  ZOFRAN ODT Your last dose was: Your next dose is:    
   
   
 Dose:  4 mg Take 1 Tab by mouth every eight (8) hours as needed for Nausea. Quantity:  10 Tab Refills:  0  
     
   
   
   
  
 oxyCODONE-acetaminophen 5-325 mg per tablet Commonly known as:  PERCOCET Your last dose was: Your next dose is:    
   
   
 Dose:  1-2 Tab Take 1-2 Tabs by mouth every four (4) hours as needed for Pain. Max Daily Amount: 12 Tabs. Quantity:  40 Tab Refills:  0 CONTINUE these medications which have NOT CHANGED Dose & Instructions Dispensing Information Comments Morning Noon Evening Bedtime  
 glucosamine-chondroitin 500-400 mg Cap Commonly known as:  [address] [place] [address] Your last dose was: Your next dose is:    
   
   
 Dose:  1 Cap Take 1 Cap by mouth daily. Refills:  0 MULTIPLE VITAMINS tablet Generic drug:  multivitamin Your last dose was: Your next dose is:    
   
   
 Dose:  1 Tab Take 1 Tab by mouth daily. Refills:  0  
     
   
   
   
  
 SYNTHROID 150 mcg tablet Generic drug:  levothyroxine Your last dose was: Your next dose is:    
   
   
 Dose:  150 mcg Take 150 mcg by mouth daily (before breakfast). Refills:  0 Venlafaxine 150 mg Tr24 Your last dose was: Your next dose is:    
   
   
 Dose:  150 mg Take 150 mg by mouth nightly. Refills:  0 Where to Get Your Medications Information on where to get these meds will be given to you by the nurse or doctor. ! Ask your nurse or doctor about these medications  
  ondansetron 4 mg disintegrating tablet  
 oxyCODONE-acetaminophen 5-325 mg per tablet Discharge Instructions PATIENT DISCHARGE INSTRUCTIONS PATIENT DISCHARGE INSTRUCTIONS Desiree Weston / [id number] : 1980 Admitted (Not on file) Discharged: 8/10/2017 · It is important that you take the medication exactly as they are prescribed. · Keep your medication in the bottles provided by the pharmacist and keep a list of the medication names, dosages, and times to be taken in your wallet. · Do not take other medications without consulting your doctor. What to do at [facility] Recommended Diet: soft diet, advance as tolerated. Recommended Activity: No heavy lifting, pushing, pulling x 2 weeks; may shower Saturday. If you experience any of the following symptoms (fever, chills, wound drainage) please follow up with General Surgery. Future Appointments Date Time Provider Cesar Leblanc 2017 11:40 AM ANDREA Sampson  
2017 8:40 AM ANDREA Sampson Signed By: David Salazar MD   
 August 10, 2017 Learning About Using an Incentive Spirometer What is an incentive spirometer? An incentive spirometer is a handheld device that exercises your lungs and measures how much air you can breathe in. It tells you and your doctor how well your lungs are working. The spirometer can help you practice taking deep breaths. Deep breaths can help open your airways and prevent fluid or mucus from building up in your lungs, and make it easier for you to breathe. Using the device can help prevent serious lung infections like pneumonia, improve your breathing after you've had pneumonia or surgery, and keep your airways open and lungs active if you can't get out of bed. How do you use an incentive spirometer? When you use an incentive spirometer, you breathe in air through a tube that is connected to a large air column containing a piston or ball. As you breathe in, the piston or ball inside the column moves up.  The height of the piston or ball shows how much air you breathed in. You may feel lightheaded when you breathe in deeply for this exercise. If you feel dizzy or feel like you're going to pass out, stop the exercise and rest. 
Each time you do this exercise, keep track of your progress by writing down how high the piston or ball moves up the column. 4. Move the slider on the outside of the large column to the level that you want to reach or that your doctor recommended. 5. Sit or stand up straight, and hold the spirometer in front of you. Be sure to keep it level. 6. To start, breathe out normally. Then close your lips tightly around the mouthpiece. Make sure that you don't block the mouthpiece with your tongue. 7. Take a slow, deep breath. Breathe in as deeply as you can. As you breathe in, the piston or ball inside the large column will move up. Try to move the piston or ball as high up as you can or to the level your doctor recommended. When you can't breathe in anymore, hold your breath for 2 to 5 seconds. 8. Relax, take the mouthpiece out of your mouth, and breathe out normally. 9. Repeat steps 1 through 5 as many times as your doctor tells you to. 10. After you've taken the recommended number of breaths, try to cough a few times. This will help loosen any mucus that has built up in your lungs. It will make it easier for you to breathe. If you just had surgery on your belly or chest, hold a pillow over your cut (incision) when you cough. Follow-up care is a key part of your treatment and safety. Be sure to make and go to all appointments, and call your doctor if you are having problems. It's also a good idea to know your test results and keep a list of the medicines you take. After general anesthesia or intravenous sedation, for 24 hours or while taking prescription Narcotics: · Limit your activities · Do not drive and operate hazardous machinery · Do not make important personal or business decisions · Do  not drink alcoholic beverages · If you have not urinated within 8 hours after discharge, please contact your surgeon on call. Report the following to your surgeon: 
· Excessive pain, swelling, redness or odor of or around the surgical area · Temperature over 100.5 · Nausea and vomiting lasting longer than 4 hours or if unable to take medications · Any signs of decreased circulation or nerve impairment to extremity: change in color, persistent  numbness, tingling, coldness or increase pain · Any questions Discharge Instructions Attachments/References MEFS - OXYCODONE/ACETAMINOPHEN (PERCOCET, ROXICET) - (BY MOUTH) (ENGLISH) MEFS - ONDANSETRON (ZOFRAN, ZOFRAN ODT, ZUPLENZ) - (BY MOUTH, INTO THE MOUTH) (ENGLISH) Discharge Orders None Introducing [facility]! Emmanuelle Quiroz introduces [employer] patient portal. Now you can access parts of your medical record, email your doctor's office, and request medication refills online. 1. In your internet browser, go to [email or web address] 2. Click on the First Time User? Click Here link in the Sign In box. You will see the New Member Sign Up page. 3. Enter your [employer] Access Code exactly as it appears below. You will not need to use this code after youve completed the sign-up process. If you do not sign up before the expiration date, you must request a new code. · [employer] Access Code: [email or web address] Expires: 10/4/2017 12:07 PM 
 
4. Enter the last four digits of your Social Security Number (xxxx) and Date of Birth (mm/dd/yyyy) as indicated and click Submit. You will be taken to the next sign-up page. 5. Create a [employer] ID. This will be your [employer] login ID and cannot be changed, so think of one that is secure and easy to remember. 6. Create a [employer] password. You can change your password at any time. 7. Enter your Password Reset Question and Answer. This can be used at a later time if you forget your password. 8. Enter your e-mail address. You will receive e-mail notification when new information is available in [address] 9. Click Sign Up. You can now view and download portions of your medical record. 10. Click the Download Summary menu link to download a portable copy of your medical information. If you have questions, please visit the Frequently Asked Questions section of the [employer] website. Remember, [employer] is NOT to be used for urgent needs. For medical emergencies, dial 911. Now available from your iPhone and Android! General Information Please provide this summary of care documentation to your next provider. Patient Signature:  ____________________________________________________________ Date:  ____________________________________________________________  
  
[place] Provider Signature:  ____________________________________________________________ Date:  ____________________________________________________________ More Information Oxycodone/Acetaminophen (Percocet, Roxicet) - (By mouth) Why this medicine is used:  
Treats pain. This medicine contains a narcotic pain reliever. Contact a nurse or doctor right away if you have: 
· Extreme weakness, shallow breathing, slow heartbeat · Sweating or cold, clammy skin · Skin blisters, rash, or peeling Common side effects: 
· Constipation · Nausea, vomiting · Tiredness © 2017 [address] Information is for End User's use only and may not be sold, redistributed or otherwise used for commercial purposes. Ondansetron (Zofran, Zofran ODT, Zuplenz) - (By mouth, Into the mouth) Why this medicine is used:  
Prevents nausea and vomiting. Contact a nurse or doctor right away if you have: 
· Fast, pounding, or uneven heartbeat · Lightheadedness or fainting · Trouble breathing Common side effects: 
· Headache, tiredness · Constipation, diarrhea © 2017 [facility] Information is for End User's use only and may not be sold, redistributed or otherwise used for commercial purposes.

## 2017-08-11 NOTE — OP NOTES
[address] [place] [facility] [place] [address], [address]   OP NOTE       Name:  Arcelia Hooks   MR#:  [id number]   :  1980   Account #:  [de-identified]    Surgery Date:  2017   Date of Adm:  2017       PREOPERATIVE DIAGNOSIS: Dysphagia, nausea following   laparoscopic adjustable gastric banding. POSTOPERATIVE DIAGNOSIS: Dysphagia, nausea following   laparoscopic adjustable gastric banding. PROCEDURE PERFORMED: Laparoscopic removal of adjustable   gastric band and port components (CPT 19724). ATTENDING SURGEON: Nikki Sun MD    ASSISTANT: Priscilla Banks. SOHAIL Salinas    ANESTHESIA: General endotracheal.    DRAINS: None. SPONGE COUNT: Correct. NEEDLE COUNT: Correct. SPECIMEN REMOVED: None. ESTIMATED BLOOD LOSS: 30 mL. COMPLICATIONS: None. INDICATIONS: The patient is a 26-year-old white female with a history   of morbid obesity, managed through laparoscopic adjustable gastric   banding several years ago. She developed gradual regurgitation,   dysphagia, and reflux symptoms. Her band was decompressed but   symptoms persist. Her ability to eat normally is still limited. She is   taken to the operating today for laparoscopic removal of her adjustable   gastric band and port components. I have asked Verner Landry, PA, to   assist with the procedure given the technical complexity of   laparoscopic revisional bariatric surgery. She will run the laparoscope   and assist in removal of the band. FINDINGS: Successful laparoscopic removal of AP standard Lap-  Band. DESCRIPTION OF PROCEDURE: The patient was identified as the   correct patient in the preoperative holding area and informed consent   was confirmed. After answering the patient's remaining questions, she   was taken to the operating room and placed on the operating room   table in the supine position. Sequential compression devices were   placed on both lower extremities.  Following the uneventful initiation of   general anesthesia, she was carefully secured to the operating room   table with foot board and safety strap in place. All potential pressure   points were padded with egg crate. Her abdomen was prepped and   draped in the usual sterile fashion. Final timeout was performed, and it   was confirmed she had received intravenous antibiotics. A 5 mm trocar was inserted through a small right-sided skin incision   using an Optiview technique. After confirming intraperitoneal location   of the trocar tip, insufflation with carbon dioxide gas was initiated. Once   adequate working space had been developed, the 5 mm, 30-degree   laparoscope was inserted. No signs of trocar injury were present. Gastric band tubing was identified in the upper abdomen. Two   additional 5 mm trocars were inserted through small upper abdominal   incisions using visual guidance with the laparoscope. The patient was   placed in the steep reverse Trendelenburg position. A right-sided 12   mm trocar was inserted using identical technique. Adhesions between   the falciform ligament and the omentum were taken down in the right   upper quadrant, freeing the band tubing. The [facility] liver retractor   was inserted through a small subxiphoid incision. With the liver   retracted anteriorly and cephalad, the area of the gastric band was   identified. Capsule overlying the band was incised using   electrocautery, freeing the buckle from surrounding capsule. This   allowed the band to be rotated both medially and laterally without   difficulty. The band tubing was divided, followed by evaluation of the   band buckle, thus opening the band. The band was then removed from   the retrogastric tunnel and removed from the patient's body. This   segment of capsule was then incised using the Harmonic scalpel to   improve her ability to tolerate regular food.  After confirming adequate   hemostasis and the absence of visceral injury, the Gillian Lavender liver   retractor was removed, followed by return to the supine position. Pneumoperitoneum was released, and all trocars were removed. The   12 mm incision was extended laterally 1 cm. The dissection was   carried through the subcutaneous fatty tissue and scar tissue down to   the port. The capsule surrounding the port was incised using   electrocautery, along the hub to be identified. The hub was used to   retract the port and allowed to be freed from the abdominal wall using   electrocautery and division of Ethibond sutures. The port was then   removed. The wound was irrigated with sterile saline. Remaining   Ethibond sutures were removed. After confirming adequate   hemostasis, the deep dermal layer of the port wound was   reapproximated with a running 2-0 Vicryl suture. All wounds were   infiltrated with 0.5% Marcaine without epinephrine. All skin edges were   reapproximated with a combination of subcuticular 4-0 Monocryl suture   and Dermabond. The patient tolerated the procedure well. She was   extubated in the operating room and transported to the recovery area   in stable condition. The attending surgeon, Dr. Lavonne Coelho, was scrubbed and present for   the entire procedure.         MD RORO Nieto / OLLIE   D:  08/11/2017   08:48   T:  08/11/2017   10:11   Job #:  [id number]

## 2017-08-11 NOTE — ROUTINE PROCESS
Patient: Maribel Hahn MRN: [id number]  SSN: [id number]   YOB: 1980  Age: 40 y.o.   Sex: female     Patient is status post     Surgeon(s) and Role:     * Blaire Gandhi MD - Primary    Local/Dose/Irrigation:  30 ml .5 marcaine plain to trocar sites                  Peripheral IV 08/11/17 Right Hand (Active)            Airway - Endotracheal Tube 08/11/17 Oral (Active)                   Dressing/Packing:  Wound Abdomen Anterior-DRESSING TYPE: Topical skin adhesive/glue (08/11/17 0700)  Splint/Cast:  ]    Other:  NO EGD done

## 2017-08-11 NOTE — H&P
Subjective:       Jorge Gutierrez is a 40 y.o. female with a history of morbid obesity managed through laparoscopic adjustable gastric banding in 2005. She has developed dysphagia, regurgitation, and GERD symptoms. These symptoms have improved somewhat following band decompression but her ability to eat normally is still limited. She denies fever, chills, hematemesis, aspiration, wheezing, or chest pain.           Patient Active Problem List     Diagnosis Date Noted    Esophageal dysphagia 07/17/2017    Regurgitation 07/17/2017    Non-intractable vomiting with nausea 07/17/2017    Gastroesophageal reflux disease without esophagitis 07/17/2017           Past Medical History:   Diagnosis Date    Headache      Hypothyroid      Psychiatric disorder       DEPRESSION    Sleep apnea       USES A MOUTH GUARD            Past Surgical History:   Procedure Laterality Date    ABDOMEN SURGERY PROC UNLISTED   2005     Lap band surgery by Dr. Robbie Smith.  HX HEENT   1997     wisdom teeth removed    SINUS SURGERY PROC UNLISTED   2013              Social History    Substance Use Topics     Smoking status: Never Smoker     Smokeless tobacco: Never Used     Alcohol use Yes         Comment: Socially              Family History   Problem Relation Age of Onset    Heart Disease Father      Hypertension Father      Anesth Problems Mother         NAUSEA, NO VOMITING    Asthma Brother      Cancer Maternal Grandmother         BREAST CA    Diabetes Neg Hx             Current Outpatient Prescriptions   Medication Sig    glucosamine-chondroitin (ARTHX) 500-400 mg cap Take 1 Cap by mouth daily.  multivitamin (MULTIPLE VITAMINS) tablet Take 1 Tab by mouth daily.  Venlafaxine 150 mg tr24 Take 150 mg by mouth nightly.  levothyroxine (SYNTHROID) 150 mcg tablet Take 150 mcg by mouth daily (before breakfast).      No current facility-administered medications for this visit.             Allergies   Allergen Reactions    Ceclor [Cefaclor] Itching    Sulfa (Sulfonamide Antibiotics) Itching         Review of Systems:    A complete review of systems was negative except as noted in the HPI.     Objective:          Visit Vitals    /87 (BP 1 Location: Right arm, BP Patient Position: At rest)    Pulse 74    Temp 98.1 °F (36.7 °C)    Resp 18    Ht 5' 7.5\" (1.715 m)    Wt 280 lb (127 kg)    LMP 07/04/2017    SpO2 98%    BMI 43.21 kg/m2          Physical Exam:  GENERAL: alert, cooperative, no distress, appears stated age, morbidly obese, EYE: negative, THROAT & NECK: normal, LUNG: clear to auscultation bilaterally, HEART: regular rate and rhythm, S1, S2 normal, no murmur. ABDOMEN: Obese, non-distended, soft; palpable, non-tender port. No pain with palpation or cellulitis. EXTREMITIES:  extremities normal, atraumatic, no cyanosis or edema, SKIN: Normal., NEUROLOGIC: negative        Assessment:      Dysphagia following laparoscopic adjustable gastric banding.     Plan:      1. I recommend proceeding with laparoscopic removal of band/port components with upper endoscopy.      2. Discussed aspects of surgical intervention, methods, risks (including by not limited to infection, bleeding, hematoma, conversion to open procedure, recurrent/persistent symptoms, and perforation of the intestines or solid organs), and the risks of general anesthetic. The patient understands the risks; all questions were answered to the patient's satisfaction.     3. Patient does wish to proceed with surgery.

## 2017-08-11 NOTE — PERIOP NOTES
Received SBAR from Yeyo Orr RN in PACU, 7A. Patient sleeping comfortably. VS's stable. 02 sat 99% on 3L/min NC. Operative site intact. IV site intact. No s/s of nausea, SOB, pain or discomfort.

## 2017-08-11 NOTE — PERIOP NOTES
Patient placed supine on OR bed, arms secured to padded armboards. Foot board secured to end of OR bed,  Safety straps placed over upper thighs and lower legs. Surgeon assisted and approved final patient position. Proper body alignment maintained during procedure. 1000 ml NACL used prn for irrigation via suction .

## 2017-08-11 NOTE — ANESTHESIA PREPROCEDURE EVALUATION
Anesthetic History     PONV          Review of Systems / Medical History  Patient summary reviewed, nursing notes reviewed and pertinent labs reviewed    Pulmonary        Sleep apnea           Neuro/Psych         Psychiatric history     Cardiovascular  Within defined limits                Exercise tolerance: >4 METS     GI/Hepatic/Renal  Within defined limits              Endo/Other      Hypothyroidism       Other Findings              Physical Exam    Airway  Mallampati: II  TM Distance: 4 - 6 cm  Neck ROM: normal range of motion, short neck   Mouth opening: Normal     Cardiovascular  Regular rate and rhythm,  S1 and S2 normal,  no murmur, click, rub, or gallop             Dental  No notable dental hx       Pulmonary  Breath sounds clear to auscultation               Abdominal  GI exam deferred       Other Findings            Anesthetic Plan    ASA: 2  Anesthesia type: general          Induction: Intravenous  Anesthetic plan and risks discussed with: Patient

## 2017-08-14 ENCOUNTER — TELEPHONE (OUTPATIENT)
Dept: SURGERY | Age: 37
End: 2017-08-14

## 2017-08-14 NOTE — TELEPHONE ENCOUNTER
----- Message from Megan Locke LPN sent at 4/17/1744  9:14 AM EDT -----  Call for 48hr po appointment.

## 2017-08-15 NOTE — TELEPHONE ENCOUNTER
Bariatric Post-Operative Phone Calls: 48 hour phone call    Diet:Question of any nausea and/or vomiting. Protein intake (goal is 60 grams of protein daily)   Poor____Fair____Good_x___Great____     Comment:______________________________________________________________      ______________________________________________________________________    Hydration:Less than 32 ounces of water daily is fair to poor (Goal is 64 ounces per day)   Poor____ Fair____ Good_x___Great____    Comment:______________________________________________________________    ______________________________________________________________________      Ambulation:( walking at least 3 x week, for 15- 20 minutes)     Poor______ Fair______ Good______     Great__x____ Comment:__________________________________________________    ______________________________________________________________________      Urine Color: Question of any odor and color(should be harmony, pale, and clear) Dark______ Amber___x___ Pale__x____      Clear__x____ Comment:___________________________________________________                           ________________________________________________________________    Bowel movements: Question of any constipation- haven't had any bowel movements for more than 3 days. This could be related to protein intake and/or narcotic pain medication usage. Comment:                                                                                      having bm's                                        Pain: Left sided abdominal pain is normal (should be less than 3)  Question if pain medication is helpful.  10___ 9___ 8___ 7___ 6___ 5___ 4___ 3___     2___1___0_x_Comment:_________________________________________________    ______________________________________________________________________      Incision: (No redness, pain, swelling or fever) Healing Well__x____     Healed______Redness_________ Pain_________     Swelling_________ Fever__________(greater than 101 needs evaluation)    Comment:____________________________________________________________    ______________________________________________________________________  Use of incentive spirometer: Yes____       No           Next Appointment:___8/25/17___________                 Support Group: Yes______No___x___    Additional Comments:____________________________________________________________    ____________________________________________________________________      If more than one parameter is not met or considered poor, nurse needs to discuss with provider recommend for patient to be seen in the office as soon as possible or refer to the provider for follow-up. Reinforce to patient to use bariatric educational booklet as guide. It is appropriate to refer patient to the nutritionist to discuss more in detail of diet and nutrition.

## 2017-08-15 NOTE — TELEPHONE ENCOUNTER
----- Message from Shawna Lerma LPN sent at 6/95/1776  9:14 AM EDT -----  Call for 48hr po appointment.

## 2017-08-22 ENCOUNTER — TELEPHONE (OUTPATIENT)
Dept: SURGERY | Age: 37
End: 2017-08-22

## 2017-08-22 NOTE — TELEPHONE ENCOUNTER
I called the patient and I left her a voice mail to call me back in regards to her 3 week post op call.

## 2017-08-22 NOTE — TELEPHONE ENCOUNTER
----- Message from Rob Cao LPN sent at 7/84/3596  9:16 AM EDT -----  Regarding: 3 wk po call  Laparoscopic removal of adjustable   gastric band and port components on 8/1/17

## 2017-08-25 ENCOUNTER — OFFICE VISIT (OUTPATIENT)
Dept: SURGERY | Age: 37
End: 2017-08-25

## 2017-08-25 VITALS
WEIGHT: 275 LBS | HEART RATE: 78 BPM | SYSTOLIC BLOOD PRESSURE: 120 MMHG | OXYGEN SATURATION: 98 % | TEMPERATURE: 98.9 F | BODY MASS INDEX: 43.16 KG/M2 | DIASTOLIC BLOOD PRESSURE: 80 MMHG | HEIGHT: 67 IN | RESPIRATION RATE: 16 BRPM

## 2017-08-25 DIAGNOSIS — Z09 FOLLOW-UP EXAMINATION FOLLOWING SURGERY: Primary | ICD-10-CM

## 2017-08-25 DIAGNOSIS — E66.01 MORBID OBESITY WITH BMI OF 40.0-44.9, ADULT (HCC): ICD-10-CM

## 2017-08-25 NOTE — MR AVS SNAPSHOT
Visit Information Date & Time Provider Department Dept. Phone Encounter #  
 8/25/2017 11:40 AM Paige Hussein NP [facility] [place] [phone] [id number] Your Appointments 9/8/2017  8:40 AM  
POST OP 10 MIN with Paige Hussein NP  
[facility] [phone] [facility] Appt Note: PO #2  
 [address] [facility] [address]  
[facility] Upcoming Health Maintenance Date Due DTaP/Tdap/Td series (1 - Tdap) 4/9/2001 PAP AKA CERVICAL CYTOLOGY 4/9/2001 INFLUENZA AGE 9 TO ADULT 8/1/2017 Allergies as of 8/25/2017  Review Complete On: 8/11/2017 By: Miles Soto RN Severity Noted Reaction Type Reactions Ceclor [Cefaclor]  04/30/2011   Side Effect Itching Sulfa (Sulfonamide Antibiotics)  04/30/2011   Side Effect Itching Current Immunizations  Never Reviewed No immunizations on file. Not reviewed this visit Vitals BP Pulse Temp Resp Height(growth percentile) Weight(growth percentile) 120/80 78 98.9 °F (37.2 °C) 16 5' 7\" (1.702 m) 275 lb (124.7 kg) LMP SpO2 BMI OB Status Smoking Status 07/04/2017 98% 43.07 kg/m2 Having regular periods Never Smoker Vitals History BMI and BSA Data Body Mass Index Body Surface Area 43.07 kg/m 2 2.43 m 2 Your Updated Medication List  
  
   
This list is accurate as of: 8/25/17 12:57 PM.  Always use your most recent med list.  
  
  
  
  
 glucosamine-chondroitin 500-400 mg Cap Commonly known as:  [facility] Take 1 Cap by mouth daily. MULTIPLE VITAMINS tablet Generic drug:  multivitamin Take 1 Tab by mouth daily. SYNTHROID 150 mcg tablet Generic drug:  levothyroxine Take 150 mcg by mouth daily (before breakfast). Venlafaxine 150 mg Tr24 Take 150 mg by mouth nightly. Patient Instructions Activity:  You may swim in a pool. Continue to avoid heavy lifting, pushing or pulling > 15lbs. No abdominal exercises for a few weeks. Continue to focus on protein and produce. Plan on phone follow up on 9/8/17 after 3 pm 
 
 
 
 
  
Introducing [facility] SERVICES! Rose Bassett introduces [employer] patient portal. Now you can access parts of your medical record, email your doctor's office, and request medication refills online. 1. In your internet browser, go to [email or web address] 2. Click on the First Time User? Click Here link in the Sign In box. You will see the New Member Sign Up page. 3. Enter your [employer] Access Code exactly as it appears below. You will not need to use this code after youve completed the sign-up process. If you do not sign up before the expiration date, you must request a new code. · [employer] Access Code: [email or web address] Expires: 10/4/2017 12:07 PM 
 
4. Enter the last four digits of your Social Security Number (xxxx) and Date of Birth (mm/dd/yyyy) as indicated and click Submit. You will be taken to the next sign-up page. 5. Create a [employer] ID. This will be your [employer] login ID and cannot be changed, so think of one that is secure and easy to remember. 6. Create a [employer] password. You can change your password at any time. 7. Enter your Password Reset Question and Answer. This can be used at a later time if you forget your password. 8. Enter your e-mail address. You will receive e-mail notification when new information is available in [address] 9. Click Sign Up. You can now view and download portions of your medical record. 10. Click the Download Summary menu link to download a portable copy of your medical information. If you have questions, please visit the Frequently Asked Questions section of the [employer] website. Remember, [employer] is NOT to be used for urgent needs. For medical emergencies, dial 911. Now available from your iPhone and Android! Please provide this summary of care documentation to your next provider. Your primary care clinician is listed as Sixto Cameron. If you have any questions after today's visit, please call [phone].

## 2017-08-25 NOTE — PATIENT INSTRUCTIONS
Activity:  You may swim in a pool. Continue to avoid heavy lifting, pushing or pulling > 15lbs. No abdominal exercises for a few weeks. Continue to focus on protein and produce.     Plan on phone follow up on 9/8/17 after 3 pm

## 2017-08-25 NOTE — PROGRESS NOTES
1. Have you been to the ER, urgent care clinic since your last visit? Hospitalized since your last visit?  no    2. Have you seen or consulted any other health care providers outside of the [address] [place] [address] since your last visit? Include any pap smears or colon screening.    pcp

## 2017-08-30 NOTE — PROGRESS NOTES
Chief Complaint   Patient presents with    Surgical Follow-up     2 weeks s/p lap removal LAGB by Dr Marleny Simms. lost 7.5 pounds. Skip Escalante is a 40 y.o. female presents 2 weeks s/p removal of adjustable gastric band system. She feels \"great\" and no issues./  No fever or chills, chest pain or shortness of breath. No reflux, night-time cough, heartburn or regurgitation. Pain is minimal and \"just sore\" with activity. She is pleased to have the device out. Visit Vitals    /80    Pulse 78    Temp 98.9 °F (37.2 °C)    Resp 16    Ht 5' 7\" (1.702 m)    Wt 275 lb (124.7 kg)    LMP 07/04/2017    SpO2 98%    BMI 43.07 kg/m2     A + O x 3  Chest  CTA  COR  RRR  ABD Soft, lap sites are C/D/I and no erythema or induration, minimal tenderness; ND, +BS, no masses or hernias. EXT No edema; ambulating independently      ICD-10-CM ICD-9-CM    1. Follow-up examination following surgery Z09 V67.00    2. Morbid obesity with BMI of 40.0-44.9, adult ([facility] [address] E66.01 278.01     Z68.41 V85.41    3. BMI 40.0-44.9, adult [facility]) Z68.41 V85.41      Doing well s/p removal of adjustable gastric band   Diet as tolerated   Activity caution with heavy lifting and no abdominal exercises x 2 weeks   Will plan on phone follow up in 2 weeks   Prn OV and discharged from surgical care   Skip Escalante verbalized understanding and questions were answered to the best of my knowledge and ability. Healthy lifestyle  educational materials were provided.

## 2017-09-15 ENCOUNTER — TELEPHONE (OUTPATIENT)
Dept: SURGERY | Age: 37
End: 2017-09-15

## 2017-09-15 NOTE — TELEPHONE ENCOUNTER
----- Message from Rocky Parsons NP sent at 9/15/2017  8:44 AM EDT -----  Regarding: check on pt   She is 4 weeks + band removal - can you touch base and see how she is doing?   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

## 2017-09-15 NOTE — TELEPHONE ENCOUNTER
I called the patient and I left her a voice mail to call me back, checking on her to see how she is doing.

## 2022-03-18 PROBLEM — K21.9 GASTROESOPHAGEAL REFLUX DISEASE WITHOUT ESOPHAGITIS: Status: ACTIVE | Noted: 2017-07-17

## 2022-03-19 PROBLEM — R11.2 NON-INTRACTABLE VOMITING WITH NAUSEA: Status: ACTIVE | Noted: 2017-07-17

## 2022-03-19 PROBLEM — R13.19 ESOPHAGEAL DYSPHAGIA: Status: ACTIVE | Noted: 2017-07-17

## 2024-11-12 ENCOUNTER — OFFICE VISIT (OUTPATIENT)
Age: 44
End: 2024-11-12

## 2024-11-12 VITALS
RESPIRATION RATE: 16 BRPM | BODY MASS INDEX: 27.31 KG/M2 | HEART RATE: 90 BPM | OXYGEN SATURATION: 98 % | DIASTOLIC BLOOD PRESSURE: 78 MMHG | WEIGHT: 174 LBS | TEMPERATURE: 98.7 F | HEIGHT: 67 IN | SYSTOLIC BLOOD PRESSURE: 113 MMHG

## 2024-11-12 DIAGNOSIS — J01.00 ACUTE NON-RECURRENT MAXILLARY SINUSITIS: Primary | ICD-10-CM

## 2024-11-12 RX ORDER — TIRZEPATIDE 10 MG/.5ML
INJECTION, SOLUTION SUBCUTANEOUS
COMMUNITY
Start: 2024-04-25

## 2024-11-12 RX ORDER — LEVOTHYROXINE SODIUM 150 UG/1
150 TABLET ORAL DAILY
COMMUNITY
Start: 2024-05-23 | End: 2025-05-23

## 2024-11-12 RX ORDER — AZITHROMYCIN 250 MG/1
TABLET, FILM COATED ORAL
Qty: 6 TABLET | Refills: 0 | Status: SHIPPED | OUTPATIENT
Start: 2024-11-12 | End: 2024-11-22

## 2024-11-12 RX ORDER — VENLAFAXINE HYDROCHLORIDE 150 MG/1
150 TABLET, EXTENDED RELEASE ORAL NIGHTLY
COMMUNITY

## 2024-11-12 NOTE — PROGRESS NOTES
2024   Edith Yanez (: 1980) is a 44 y.o. female, New patient, here for evaluation of the following chief complaint(s):  Congestion (Pt c/o sinus congestion that started yesterday. Woke up with sore throat, coughing up yellow mucus, also having ear ache, \"face pain\".)     ASSESSMENT/PLAN:  Below is the assessment and plan developed based on review of pertinent history, physical exam, labs, studies, and medications.  1. Acute non-recurrent maxillary sinusitis  -     azithromycin (ZITHROMAX) 250 MG tablet; 500mg on day 1 followed by 250mg on days 2 - 5, Disp-6 tablet, R-0Normal      Handout given with care instructions  2. OTC for symptom management. Increase fluid intake, ensure adequate nutritional intake.  3. Follow up with PCP as needed.  4. Go to ED with development of any acute symptoms.     Follow up:  Return in about 1 week (around 2024), or if symptoms worsen or fail to improve 5-7 days.  Follow up immediately for any new, worsening or changes or if symptoms are not improving over the next 5-7 days.     SUBJECTIVE/OBJECTIVE:  Upper Respiratory Infection  Patient complains of symptoms of a URI. Symptoms include congestion, cough, and plugged sensation in both ears and sore throat. Onset of symptoms was 1 days ago, unchanged since that time. She also c/o congestion, nasal congestion, no  fever, productive cough with  yellow and green colored sputum, and sore throat for the past 2 days .  She is drinking plenty of fluids. Evaluation to date: none. Treatment to date: none.            History provided by:  Patient   used: No         There are no diagnoses linked to this encounter.    Congestion (Pt c/o sinus congestion that started yesterday. Woke up with sore throat, coughing up yellow mucus, also having ear ache, \"face pain\".)      No results found for any visits on 24.      XR Results (most recent):  @BSHSILASTIMGCAT(JLB3882:1)@         Physical Exam  Vitals

## 2024-11-12 NOTE — PATIENT INSTRUCTIONS
Thank you for visiting [facility] today    Nasal Congestion:  Flonase or Nasonex (over the counter) nasal spray, once a day  Normal saline nasal spray  Afrin nasal spray no longer than three days  If you have high blood pressure, take Coricidin HBP (or the generic form) instead. Follow instructions on the box  Cough:  Throat lozenges, hot tea, and honey may help  Vicks VapoRub at night to help with cough and relieve muscles aches and pain  If not prescribed a cough medication, Robitussin DM is an option. It is an over the counter cough medication containing dextromethorphan to help suppress cough at night   *Please only take when absolutely needed, as this is a controlled substance that can cause addiction   *Please only take cough syrup at nighttime as it causes drowsiness   *Do not drive or operate any machinery while taking this medication  Chest Congestion:  For thick mucus, take Mucinex (with Guafenesin only) to help thin the mucus. Follow instructions on the box. You will need to drink plenty of water with this medication  Sore Throat:  Lozenges, as needed. Cepacol lozenges will help numb the throat  Chloraseptic spray also helps to numb throat pain  Salt water gargles (1/4 tsp salt in 8 oz of water) to soothe throat pain  Sinus pain/pressure:  Warm, wet towel on face to help with facial sinus pain/pressure  Headache/Pain Fever/Body Aches:  If you can take NSAIDs, take Ibuprofen 400-600 mg every 8 hours as needed  If you cannot take NSAIDs, take Tylenol 325-500 mg every 6 hours as needed  Miscellanous:  Zyrtec/Xyzal/Allegra/Claritin during the day or Benadryl at night may help with allergies. You  may also use the decongestant version of these medications  Simple foods like chicken noodle soup, smoothies, hot tea with lemon and honey may also help  Avoid smoking  Minimize exposure to irritants    Please follow up with your primary care provider within 2-5 days if your signs and symptoms have not

## 2025-05-31 ENCOUNTER — OFFICE VISIT (OUTPATIENT)
Age: 45
End: 2025-05-31

## 2025-05-31 VITALS
HEIGHT: 67 IN | OXYGEN SATURATION: 97 % | BODY MASS INDEX: 26.68 KG/M2 | DIASTOLIC BLOOD PRESSURE: 69 MMHG | TEMPERATURE: 98.3 F | HEART RATE: 70 BPM | WEIGHT: 170 LBS | SYSTOLIC BLOOD PRESSURE: 106 MMHG | RESPIRATION RATE: 15 BRPM

## 2025-05-31 DIAGNOSIS — B96.89 ACUTE BACTERIAL SINUSITIS: Primary | ICD-10-CM

## 2025-05-31 DIAGNOSIS — J01.90 ACUTE BACTERIAL SINUSITIS: Primary | ICD-10-CM

## 2025-05-31 RX ORDER — AZELASTINE 1 MG/ML
1 SPRAY, METERED NASAL 2 TIMES DAILY
Qty: 30 ML | Refills: 0 | Status: SHIPPED | OUTPATIENT
Start: 2025-05-31

## 2025-05-31 RX ORDER — FLUCONAZOLE 150 MG/1
150 TABLET ORAL ONCE
Qty: 1 TABLET | Refills: 0 | Status: SHIPPED | OUTPATIENT
Start: 2025-05-31 | End: 2025-05-31

## 2025-05-31 ASSESSMENT — ENCOUNTER SYMPTOMS
SINUS COMPLAINT: 1
EYES NEGATIVE: 1
COUGH: 1
SINUS PAIN: 1
ALLERGIC/IMMUNOLOGIC NEGATIVE: 1
SINUS PRESSURE: 1
GASTROINTESTINAL NEGATIVE: 1

## 2025-05-31 NOTE — PROGRESS NOTES
review of pertinent history, physical exam, labs, studies, and medications.       Assessment & Plan  Acute bacterial sinusitis   Alert and Oriented x3  Pt in no acute distress    The patient presents with symptoms suspicious for likely acute sinus infection.  Differential includes acute bronchitis, allergic rhinitis, bacterial pneumonia, viral rhinosinusitis or COVID.  Do not suspect underlying cardiopulmonary process.  I considered, but think unlikely, dangerous causes of this patient's symptoms to include ACS, CHF or COPD exacerbations, pneumonia or pneumothorax.  The exam does not reveal orbital/periorbital cellulitis, intracranial abscess or meningitis.  Pt does not present with symptoms that are concerning for complicated acute bacterial rhinosinusitis such as high, persistent fevers >102F; periorbital edema, inflammation, or erythema; cranial nerve palsies; abnormal extraocular movements; proptosis; vision changes (double vision or impaired vision); severe headache; altered mental status; or meningeal signs.  Patient is nontoxic appearing and not in need of emergent medical intervention.    -Provided educational material and patient instructions  -Discharged patient with instructions to follow up with PCP  -Advised to go immediately to the ED for worsening or persistent symptoms      Orders:    [facility] [place]      Handout given with care instructions  OTC for symptom management. Increase fluid intake, ensure adequate nutritional intake.  Follow up with PCP as needed.  Go to ED with development of any acute symptoms.     Follow up:  Return in about 1 week (around 6/7/2025).  Follow up immediately for any new, worsening or changes or if symptoms are not improving over the next 5-7 days.       Patient Care Team:  Otto Wise MD as PCP - Raya Ramírez APRN - NP as Nurse Practitioner  Onur Mathews MD as Surgeon    Patient Active Problem

## 2025-06-01 NOTE — PATIENT INSTRUCTIONS
Thank you for visiting [facility] today.    Flonase (over the counter) nasal spray, once a day  Saline nasal sprays 1-3 days   Afrin nasal spray for no longer than 3-5 days  Ibuprofen (400-800 mg) every 8 hours as needed or Tylenol 325-500 mg every 6 hours  Zyrtec,Xyzal,Allegra,or Claritin during the day or Benadryl at night may help with allergies.  You may use the decongestant version of these medications as well.  Simple foods like chicken noodle soup, smoothies, hot tea with lemon and honey may also help  Steam inhalation, humidifier, warm compresses  Increase oral fluids to maintain hydration  Vitamin D 4000 IU each day for one month  1/2 teaspoon turmeric each day for anti-inflammation  Avoid smoking or exposure to smoke and minimize contact with environmental irritants  Antibiotics with food and probiotics     Please follow up with your primary care provider if your symptoms last more than 10 days or worsen.    Please go immediately to the Emergency Department if you develop:  Fever higher than 102F (38.9C), sudden and severe pain in the face and head, trouble seeing or seeing double, trouble thinking clearly, swelling or redness around one or both eyes or a stiff neck

## (undated) DEVICE — UNIVERSAL FIXATION CANNULA: Brand: VERSAONE

## (undated) DEVICE — FILTER SMK EVAC FLO CLR MEGADYNE

## (undated) DEVICE — NEEDLE HYPO 22GA L1.5IN BLK S STL HUB POLYPR SHLD REG BVL

## (undated) DEVICE — DERMABOND SKIN ADH 0.7ML -- DERMABOND ADVANCED 12/BX

## (undated) DEVICE — SHEAR HARMONIC 5MMX45CM -- ACE 7+

## (undated) DEVICE — BLADELESS OPTICAL TROCAR WITH FIXATION CANNULA: Brand: VERSAONE

## (undated) DEVICE — SOLUTION IV 1000ML 0.9% SOD CHL

## (undated) DEVICE — TUBING INSUFLTN 10FT LUER -- CONVERT TO ITEM 368568

## (undated) DEVICE — STERILE POLYISOPRENE POWDER-FREE SURGICAL GLOVES WITH EMOLLIENT COATING: Brand: PROTEXIS

## (undated) DEVICE — (D)PREP SKN CHLRAPRP APPL 26ML -- CONVERT TO ITEM 371833

## (undated) DEVICE — SUTURE MCRYL SZ 4-0 L27IN ABSRB UD L19MM PS-2 1/2 CIR PRIM Y426H

## (undated) DEVICE — KENDALL SCD EXPRESS SLEEVES, KNEE LENGTH, MEDIUM: Brand: KENDALL SCD

## (undated) DEVICE — Device

## (undated) DEVICE — REM POLYHESIVE ADULT PATIENT RETURN ELECTRODE: Brand: VALLEYLAB

## (undated) DEVICE — BLADELESS OPTICAL TROCAR WITH FIXATION CANNULA: Brand: VERSAPORT

## (undated) DEVICE — SOLUTION IRRIGATION NACL 0.9% 1000 ML FLX CONTAINER

## (undated) DEVICE — Z INACTIVE USE 2240337 DRAPE SURG PT TRANSFER TRAWAY SHT

## (undated) DEVICE — ELECTRODE ES 36CM LAP FLAT L HK COAT DISP CLEANCOAT

## (undated) DEVICE — CLICKLINE SCISSORS INSERT: Brand: CLICKLINE

## (undated) DEVICE — SURGICAL PROCEDURE KIT GEN LAPAROSCOPY LF

## (undated) DEVICE — 1200 GUARD II KIT W/5MM TUBE W/O VAC TUBE: Brand: GUARDIAN

## (undated) DEVICE — SUTURE SZ 0 27IN 5/8 CIR UR-6  TAPER PT VIOLET ABSRB VICRYL J603H

## (undated) DEVICE — DEVON™ KNEE AND BODY STRAP 60" X 3" (1.5 M X 7.6 CM): Brand: DEVON

## (undated) DEVICE — 39" SINGLE PATIENT USE HOVERMATT BREATHABLE: Brand: SINGLE PATIENT USE HOVERMATT

## (undated) DEVICE — INFECTION CONTROL KIT SYS

## (undated) DEVICE — MEDI-VAC NON-CONDUCTIVE SUCTION TUBING: Brand: CARDINAL HEALTH

## (undated) DEVICE — TROCAR SITE CLOSURE DEVICE: Brand: ENDO CLOSE